# Patient Record
(demographics unavailable — no encounter records)

---

## 2024-12-05 NOTE — HISTORY OF PRESENT ILLNESS
[FreeTextEntry1] : Pt notes a "rough year."   Father had fall that led to his death quickly. Was having pain down her right leg.   PMD had referred to pain mgmt who referred to PT.  THey noted issue probably not directly from spinal stenosis but also from ham string on that side. She notes that she has been doing better with specific chin pull exercise. Does note alot of other stress- in particular with health concerns. Does note fatty liver and ? chronic fibrosis. Does have spinal stenosis as well and concerns over that. Does have some variation in BP and some generalized discomfort. Less clear how much Emgality helps. Has been using prn Frova. Has been having coughing likely assocaited with reflux she has had. Dxd by 2 people and trying to balance that with endocrinology diet.

## 2025-02-13 NOTE — HISTORY OF PRESENT ILLNESS
[FreeTextEntry1] : Pt returns today for a follow-up appt for migraine headaches. Pt reports she stopped taking Emgality 3 months ago and has been doing neck exercises she learned at PT .  She has not had a migraine. The last time she used Frova was in November 2024. Denies any new migraine symptoms. Did have a brief episode of Vertigo .  Will do neck exercises for prevention everyday and if she feels a migraine start she will do the exercises to abort the migraine as well.  Overall, health  has been stable.

## 2025-02-13 NOTE — PHYSICAL EXAM
[General Appearance - Alert] : alert [General Appearance - In No Acute Distress] : in no acute distress [General Appearance - Well Nourished] : well nourished [General Appearance - Well Developed] : well developed [General Appearance - Well-Appearing] : healthy appearing [Oriented To Time, Place, And Person] : oriented to person, place, and time [Impaired Insight] : insight and judgment were intact [Memory Recent] : recent memory was not impaired [Memory Remote] : remote memory was not impaired [Person] : oriented to person [Place] : oriented to place [Time] : oriented to time [Short Term Intact] : short term memory intact [Remote Intact] : remote memory intact [Registration Intact] : recent registration memory intact [Concentration Intact] : normal concentrating ability [Visual Intact] : visual attention was ~T not ~L decreased [Fluency] : fluency intact [Comprehension] : comprehension intact [Current Events] : adequate knowledge of current events [Past History] : adequate knowledge of personal past history [Vocabulary] : adequate range of vocabulary [Cranial Nerves Optic (II)] : visual acuity intact bilaterally,  visual fields full to confrontation, pupils equal round and reactive to light [Cranial Nerves Oculomotor (III)] : extraocular motion intact [Cranial Nerves Trigeminal (V)] : facial sensation intact symmetrically [Cranial Nerves Facial (VII)] : face symmetrical [Cranial Nerves Vestibulocochlear (VIII)] : hearing was intact bilaterally [Cranial Nerves Glossopharyngeal (IX)] : tongue and palate midline [Cranial Nerves Accessory (XI - Cranial And Spinal)] : head turning and shoulder shrug symmetric [Cranial Nerves Hypoglossal (XII)] : there was no tongue deviation with protrusion [Motor Strength] : muscle strength was normal in all four extremities [Motor Handedness Right-Handed] : the patient is right hand dominant [Motor Strength Upper Extremities Bilaterally] : strength was normal in both upper extremities [Sensation Tactile Decrease] : light touch was intact [Balance] : balance was intact [Limited Balance] : balance was intact [Past-pointing] : there was no past-pointing [Dysdiadochokinesia Bilaterally] : not present [Sclera] : the sclera and conjunctiva were normal [No GLORIA] : no internuclear ophthalmoplegia [Strabismus] : no strabismus was seen [Outer Ear] : the ears and nose were normal in appearance [Hearing Threshold Finger Rub Not King] : hearing was normal [Neck Cervical Mass (___cm)] : no neck mass was observed [Exaggerated Use Of Accessory Muscles For Inspiration] : no accessory muscle use [Edema] : there was no peripheral edema [Abdomen Tenderness] : non-tender [Abnormal Walk] : normal gait [Nail Clubbing] : no clubbing  or cyanosis of the fingernails [Involuntary Movements] : no involuntary movements were seen [Skin Color & Pigmentation] : normal skin color and pigmentation [Skin Turgor] : normal skin turgor [] : no rash [Skin Lesions] : no skin lesions

## 2025-02-13 NOTE — REVIEW OF SYSTEMS
[Fever] : no fever [Feeling Poorly] : feeling poorly [Feeling Tired] : not feeling tired [Eye Pain] : eye pain [Eyesight Problems] : no eyesight problems [Nasal Discharge] : no nasal discharge [Chest Pain] : no chest pain [Cough] : no cough [As Noted in HPI] : as noted in HPI [Arthralgias] : arthralgias [Neck Pain] : neck pain [Skin Lesions] : no skin lesions [Itching] : no itching [Confused] : no confusion [Fainting] : no fainting [Sleep Disturbances] : sleep disturbances [Muscle Weakness] : no muscle weakness [Swollen Glands] : no swollen glands